# Patient Record
Sex: MALE | Race: WHITE | Employment: FULL TIME | URBAN - METROPOLITAN AREA
[De-identification: names, ages, dates, MRNs, and addresses within clinical notes are randomized per-mention and may not be internally consistent; named-entity substitution may affect disease eponyms.]

---

## 2017-09-18 ENCOUNTER — OFFICE VISIT (OUTPATIENT)
Dept: NEUROLOGY | Age: 57
End: 2017-09-18

## 2017-09-18 VITALS
HEART RATE: 63 BPM | OXYGEN SATURATION: 98 % | DIASTOLIC BLOOD PRESSURE: 84 MMHG | HEIGHT: 67 IN | WEIGHT: 199.6 LBS | BODY MASS INDEX: 31.33 KG/M2 | RESPIRATION RATE: 14 BRPM | TEMPERATURE: 97.1 F | SYSTOLIC BLOOD PRESSURE: 116 MMHG

## 2017-09-18 DIAGNOSIS — R20.2 PARESTHESIA: ICD-10-CM

## 2017-09-18 DIAGNOSIS — R42 DIZZINESS AND GIDDINESS: Primary | ICD-10-CM

## 2017-09-18 DIAGNOSIS — H81.13 BPPV (BENIGN PAROXYSMAL POSITIONAL VERTIGO), BILATERAL: ICD-10-CM

## 2017-09-18 DIAGNOSIS — M35.9 AUTOIMMUNE DISEASE (HCC): ICD-10-CM

## 2017-09-18 DIAGNOSIS — G45.0 VBI (VERTEBROBASILAR INSUFFICIENCY): ICD-10-CM

## 2017-09-18 NOTE — PROGRESS NOTES
Neurology Consult Note      HISTORY PROVIDED BY: patient    Chief Complaint:   Chief Complaint   Patient presents with    Dizziness    New Patient      Subjective:    Stew Carver is a 59 y.o. right handed male who presents in consultation for Dizziness. According to patient, symptoms started about 2 years ago, he got up in he morning and noticed that he was very unsteady and he had to hold on something to steady himself. He said that he waited thinking that it would go away, however, it did not, instead , he was experiencing nausea and the room appeared to be spinning, at this time he had to go the ER. In the ER, after the doctor evaluated him , was told that he had Vertigo, was given Meclizine, it later got better but since then, patient said he continue to have funny feeling in his head and intermittently, he will feel unsteady. He said that he also started having joint pains, and lot of fatigue, intermittent numbness and tingling sensation. He said that he has not noticed any thing that makes the condition worse or better, change in position does not affect the symptoms. Due to the symptom of unsteadiness , patient now is scared to engage in exercise program, he noted that he used to be very active and was going to the the gym. Because of the persistent symptoms , patient was referred to the clinic for evaluation and management  Admits occasional headache,nausea,  dizziness, neck pain, back pain, muscle and joint pain, numbness and tingling sensation. Denies loss of consciousness, tinnitus, double or blurry vision, dysphagia, odynophagia, chest pain, constipation, diarrhea, hematochezia, hematuria, dysuria    Past Medical History:   Diagnosis Date    Anxiety disorder     Fatigue     Joint pain     Vertigo       History reviewed. No pertinent surgical history.    Social History     Social History    Marital status: SINGLE     Spouse name: N/A    Number of children: N/A    Years of education: N/A Occupational History    Not on file. Social History Main Topics    Smoking status: Never Smoker    Smokeless tobacco: Never Used    Alcohol use 1.2 oz/week     2 Cans of beer per week    Drug use: No    Sexual activity: Yes     Partners: Female     Other Topics Concern    Not on file     Social History Narrative     History reviewed. No pertinent family history. Objective:   ROS  12 system review as per HPI  No Known Allergies     Meds:  Outpatient Medications Prior to Visit   Medication Sig Dispense Refill    meclizine (ANTIVERT) 25 mg tablet Take 1 Tab by mouth three (3) times daily as needed for Dizziness. 20 Tab 0    ondansetron (ZOFRAN ODT) 4 mg disintegrating tablet Take 1 Tab by mouth every eight (8) hours as needed for Nausea. 20 Tab 0     No facility-administered medications prior to visit. Imaging:  MRI Results (most recent):  No results found for this or any previous visit. CT Results (most recent):    Results from Hospital Encounter encounter on 06/21/15   CT HEAD WO CONT   Narrative **Final Report**      ICD Codes / Adm. Diagnosis: 089858   / Dizziness  dizziness  Examination:  CT HEAD WO CON  - [id number] - Jun 21 2015  7:04AM  Accession No:  [id number]  Reason:  Pain      REPORT:  EXAM:  CT HEAD WO CON    INDICATION:   Pain    COMPARISON: None. TECHNIQUE: Unenhanced CT of the head was performed using 5 mm images. Brain   and bone windows were generated. FINDINGS:  The ventricles and sulci are normal in size, shape and configuration and   midline. There is no significant white matter disease. There is no   intracranial hemorrhage, extra-axial collection, mass, mass effect or   midline shift. The basilar cisterns are open. No acute infarct is   identified. The bone windows demonstrate no abnormalities. The visualized   portions of the paranasal sinuses and mastoid air cells are clear.  There is   a tiny bubble of gas in the right cavernous sinus most likely related to IV catheter insertion. IMPRESSION: There is a tiny bubble of gas in the right cavernous sinus most   likely related to IV catheter insertion. No acute process is identified. Signing/Reading Doctor: Dottie Dacosta ([id number])    Approved: Dottie Dacosta ([id number])  Jun 21 2015  7:26AM                                     Reviewed records in [facility] [employer] tab today    Lab Review   Results for orders placed or performed during the hospital encounter of 06/21/15   CBC WITH AUTOMATED DIFF   Result Value Ref Range    WBC 6.2 4.1 - 11.1 K/uL    RBC 4.96 4.10 - 5.70 M/uL    HGB 15.2 12.1 - 17.0 g/dL    HCT 55.1 (H) 36.6 - 50.3 %    .1 (H) 80.0 - 99.0 FL    MCH 30.6 26.0 - 34.0 PG    MCHC 27.6 (L) 30.0 - 36.5 g/dL    RDW 22.9 (H) 11.5 - 14.5 %    PLATELET 420 (L) 409 - 400 K/uL    NEUTROPHILS 63 32 - 75 %    LYMPHOCYTES 27 12 - 49 %    MONOCYTES 6 5 - 13 %    EOSINOPHILS 3 0 - 7 %    BASOPHILS 1 0 - 1 %    ABS. NEUTROPHILS 3.8 1.8 - 8.0 K/UL    ABS. LYMPHOCYTES 1.7 0.8 - 3.5 K/UL    ABS. MONOCYTES 0.4 0.0 - 1.0 K/UL    ABS. EOSINOPHILS 0.2 0.0 - 0.4 K/UL    ABS. BASOPHILS 0.1 0.0 - 0.1 K/UL    RBC COMMENTS ANISOCYTOSIS  1+        RBC COMMENTS MACROCYTOSIS  2+        DF SMEAR SCANNED     METABOLIC PANEL, COMPREHENSIVE   Result Value Ref Range    Sodium 141 136 - 145 mmol/L    Potassium 4.1 3.5 - 5.1 mmol/L    Chloride 104 97 - 108 mmol/L    CO2 27 21 - 32 mmol/L    Anion gap 10 5 - 15 mmol/L    Glucose 201 (H) 65 - 100 mg/dL    BUN 20 6 - 20 MG/DL    Creatinine 1.06 0.45 - 1.15 MG/DL    BUN/Creatinine ratio 19 12 - 20      GFR est AA >60 >60 ml/min/1.73m2    GFR est non-AA >60 >60 ml/min/1.73m2    Calcium 8.3 (L) 8.5 - 10.1 MG/DL    Bilirubin, total 0.5 0.2 - 1.0 MG/DL    ALT (SGPT) 30 12 - 78 U/L    AST (SGOT) 17 15 - 37 U/L    Alk.  phosphatase 67 45 - 117 U/L    Protein, total 6.8 6.4 - 8.2 g/dL    Albumin 3.5 3.5 - 5.0 g/dL    Globulin 3.3 2.0 - 4.0 g/dL    A-G Ratio 1.1 1.1 - 2.2     URINALYSIS W/ REFLEX CULTURE   Result Value Ref Range    Color YELLOW/STRAW      Appearance CLEAR CLEAR      Specific gravity 1.028 1.003 - 1.030      pH (UA) 7.0 5.0 - 8.0      Protein NEGATIVE  NEG mg/dL    Glucose 100 (A) NEG mg/dL    Ketone NEGATIVE  NEG mg/dL    Bilirubin NEGATIVE  NEG      Blood NEGATIVE  NEG      Urobilinogen 1.0 0.2 - 1.0 EU/dL    Nitrites NEGATIVE  NEG      Leukocyte Esterase NEGATIVE  NEG      WBC 0-4 0 - 4 /hpf    RBC 0-5 0 - 5 /hpf    Epithelial cells FEW FEW /lpf    Bacteria NEGATIVE  NEG /hpf    UA:UC IF INDICATED CULTURE NOT INDICATED BY UA RESULT CNI      Mucus 1+ (A) NEG /lpf   TROPONIN I   Result Value Ref Range    Troponin-I, Qt. <0.04 <0.05 ng/mL   CK W/ REFLX CKMB   Result Value Ref Range    CK 74 39 - 308 U/L   EKG, 12 LEAD, INITIAL   Result Value Ref Range    Ventricular Rate 58 BPM    Atrial Rate 58 BPM    P-R Interval 174 ms    QRS Duration 98 ms    Q-T Interval 442 ms    QTC Calculation (Bezet) 433 ms    Calculated P Axis 56 degrees    Calculated R Axis 5 degrees    Calculated T Axis 48 degrees    Diagnosis       Sinus bradycardia  No previous ECGs available  Confirmed by Pelon Gomez ([id number]) on 6/21/2015 2:21:37 PM          Exam:  Visit Vitals    /84 (BP 1 Location: Left arm, BP Patient Position: Sitting)    Pulse 63    Temp 97.1 °F (36.2 °C) (Oral)    Resp 14    Ht 5' 7\" (1.702 m)    Wt 199 lb 9.6 oz (90.5 kg)    SpO2 98%    BMI 31.26 kg/m2     General:  Alert, cooperative, no distress. Head:  Normocephalic, without obvious abnormality, atraumatic. Respiratory:  Heart:   Non labored breathing  Regular rate and rhythm, no murmurs   Neck:   2+ carotids, no bruits   Extremities: Warm, no cyanosis or edema. Pulses: 2+ radial pulses. Neurologic:  MS: Alert and oriented x 4, speech intact. Language intact, able to name, repeat, and follow all commands. Attention and fund of knowledge appropriate. Recent and remote memory intact.   Cranial Nerves:  II: visual fields Full to confrontation   II: pupils Equal, round, reactive to light   II: optic disc No papilledema   III,VII: ptosis none   III,IV,VI: extraocular muscles  EOMI, no nystagmus or diplopia   V: facial light touch sensation  normal   VII: facial muscle function   symmetric   VIII: hearing intact   IX: soft palate elevation  normal   XI: trapezius strength  5/5   XI: sternocleidomastoid strength 5/5   XII: tongue  Midline     Motor: normal bulk and tone, no tremor              Strength: 5/5 throughout, no PD  Sensory: intact to LT, PP  Coordination: FTN and HTS intact, LANA intact,Romberg equivocal.  Gait: normal gait, unable to heel, toe, and tandem walk  Reflexes: 2+ symmetric, toes downgoing           Assessment/Plan       ICD-10-CM ICD-9-CM    1. Dizziness and giddiness R42 780.4 MRI BRAIN W WO CONT      VITAMIN B12      VITAMIN D, 25 HYDROXY      RHEUMATOID FACTOR, QL      TSH 3RD GENERATION      EEG      RPR      PROTEIN ELECTROPHORESIS   2. BPPV (benign paroxysmal positional vertigo), bilateral H81.13 386.11 MRI BRAIN W WO CONT      EEG      PROTEIN ELECTROPHORESIS   3. Paresthesia R20.2 782.0 MRI BRAIN W WO CONT      VITAMIN D, 25 HYDROXY      RHEUMATOID FACTOR, QL      TSH 3RD GENERATION      EEG      RPR      PROTEIN ELECTROPHORESIS   4. Autoimmune disease ([address] M35.9 279.49 VITAMIN B12      VITAMIN D, 25 HYDROXY      PROTEIN ELECTROPHORESIS   Plan:  MRI Brain w/wo contrast  EEG  Blood for autoimmune work up, B12,Vit D, TSH,RPR  May continue with Meclizine  ASA 325mg p.o. qd    Follow-up Disposition:  Return in about 4 weeks (around 10/16/2017).       Signed:  Jb Wiggins MD  9/18/2017

## 2017-09-18 NOTE — MR AVS SNAPSHOT
Visit Information Date & Time Provider Department Dept. Phone Encounter #  
 9/18/2017  2:00 PM Abdulaziz Mercado MD [facility] at [address] [place] [address] [id number] Follow-up Instructions Return in about 4 weeks (around 10/16/2017). Upcoming Health Maintenance Date Due Hepatitis C Screening 1960 DTaP/Tdap/Td series (1 - Tdap) 6/19/1979 FOBT Q 1 YEAR AGE 50-75 6/19/2008 INFLUENZA AGE 9 TO ADULT 8/1/2017 Allergies as of 9/18/2017  Review Complete On: 9/18/2017 By: Chidi Do MD  
 No Known Allergies Current Immunizations  Never Reviewed No immunizations on file. Not reviewed this visit You Were Diagnosed With   
  
 Codes Comments Dizziness and giddiness    -  Primary ICD-10-CM: S83 ICD-9-CM: 780.4 BPPV (benign paroxysmal positional vertigo), bilateral     ICD-10-CM: H81.13 
ICD-9-CM: 386.11 Paresthesia     ICD-10-CM: R20.2 ICD-9-CM: 782.0 [facility]immune disease ([facility]     ICD-10-CM: M35.9 ICD-9-CM: 279.49 Vitals BP Pulse Temp Resp Height(growth percentile) Weight(growth percentile) 116/84 (BP 1 Location: Left arm, BP Patient Position: Sitting) 63 97.1 °F (36.2 °C) (Oral) 14 5' 7\" (1.702 m) 199 lb 9.6 oz (90.5 kg) SpO2 BMI Smoking Status 98% 31.26 kg/m2 Never Smoker BMI and BSA Data Body Mass Index Body Surface Area  
 31.26 kg/m 2 2.07 m 2 Preferred Pharmacy Pharmacy Name Phone [address] [phone] Your Updated Medication List  
  
   
This list is accurate as of: 9/18/17  3:20 PM.  Always use your most recent med list.  
  
  
  
  
 CENTRUM PO Take  by mouth daily. meclizine 25 mg tablet Commonly known as:  ANTIVERT Take 1 Tab by mouth three (3) times daily as needed for Dizziness. ondansetron 4 mg disintegrating tablet Commonly known as:  ZOFRAN ODT  
 Take 1 Tab by mouth every eight (8) hours as needed for Nausea. VITAMIN D3 PO Take  by mouth daily. We Performed the Following PROTEIN ELECTROPHORESIS [35875 CPT(R)] RHEUMATOID FACTOR, QL [id number] CPT(R)] RPR [82854 CPT(R)] TSH 3RD GENERATION [86781 CPT(R)] VITAMIN B12 [id number] CPT(R)] VITAMIN D, 25 HYDROXY [id number] CPT(R)] Follow-up Instructions Return in about 4 weeks (around 10/16/2017). To-Do List   
 09/18/2017 Neurology:  EEG   
  
 09/18/2017 Imaging:  MRI BRAIN W WO CONT Introducing [facility] & HEALTH SERVICES! Torsten Larkin introduces [employer] patient portal. Now you can access parts of your medical record, email your doctor's office, and request medication refills online. 1. In your internet browser, go to [email or web address] 2. Click on the First Time User? Click Here link in the Sign In box. You will see the New Member Sign Up page. 3. Enter your [employer] Access Code exactly as it appears below. You will not need to use this code after youve completed the sign-up process. If you do not sign up before the expiration date, you must request a new code. · [employer] Access Code: [email or web address] Expires: 12/17/2017  2:05 PM 
 
4. Enter the last four digits of your Social Security Number (xxxx) and Date of Birth (mm/dd/yyyy) as indicated and click Submit. You will be taken to the next sign-up page. 5. Create a [employer] ID. This will be your Y[employer] login ID and cannot be changed, so think of one that is secure and easy to remember. 6. Create a [employer] password. You can change your password at any time. 7. Enter your Password Reset Question and Answer. This can be used at a later time if you forget your password. 8. Enter your e-mail address. You will receive e-mail notification when new information is available in [address] 9. Click Sign Up. You can now view and download portions of your medical record. 10. Click the Download Summary menu link to download a portable copy of your medical information. If you have questions, please visit the Frequently Asked Questions section of the [employer] website. Remember, [employer] is NOT to be used for urgent needs. For medical emergencies, dial 911. Now available from your iPhone and Android! Please provide this summary of care documentation to your next provider. Your primary care clinician is listed as Phys Other. If you have any questions after today's visit, please call [phone].

## 2017-09-21 LAB
25(OH)D3+25(OH)D2 SERPL-MCNC: 49.5 NG/ML (ref 30–100)
ALBUMIN SERPL ELPH-MCNC: 4 G/DL (ref 2.9–4.4)
ALBUMIN/GLOB SERPL: 1.4 {RATIO} (ref 0.7–1.7)
ALPHA1 GLOB SERPL ELPH-MCNC: 0.2 G/DL (ref 0–0.4)
ALPHA2 GLOB SERPL ELPH-MCNC: 0.6 G/DL (ref 0.4–1)
B-GLOBULIN SERPL ELPH-MCNC: 1.1 G/DL (ref 0.7–1.3)
GAMMA GLOB SERPL ELPH-MCNC: 0.9 G/DL (ref 0.4–1.8)
GLOBULIN SER CALC-MCNC: 2.8 G/DL (ref 2.2–3.9)
M PROTEIN SERPL ELPH-MCNC: NORMAL G/DL
PLEASE NOTE, 011150: NORMAL
PROT SERPL-MCNC: 6.8 G/DL (ref 6–8.5)
RHEUMATOID FACT SERPL-ACNC: <10 IU/ML (ref 0–13.9)
RPR SER QL: NON REACTIVE
TSH SERPL DL<=0.005 MIU/L-ACNC: 1.06 UIU/ML (ref 0.45–4.5)
VIT B12 SERPL-MCNC: 617 PG/ML (ref 211–946)

## 2017-09-27 ENCOUNTER — HOSPITAL ENCOUNTER (OUTPATIENT)
Dept: NEUROLOGY | Age: 57
Discharge: HOME | End: 2017-09-27
Attending: PSYCHIATRY & NEUROLOGY
Payer: OTHER GOVERNMENT

## 2017-09-27 ENCOUNTER — HOSPITAL ENCOUNTER (OUTPATIENT)
Dept: MRI IMAGING | Age: 57
Discharge: HOME | End: 2017-09-27
Attending: PSYCHIATRY & NEUROLOGY
Payer: OTHER GOVERNMENT

## 2017-09-27 VITALS — BODY MASS INDEX: 31.17 KG/M2 | WEIGHT: 199 LBS

## 2017-09-27 DIAGNOSIS — R42 DIZZINESS AND GIDDINESS: ICD-10-CM

## 2017-09-27 DIAGNOSIS — H81.13 BPPV (BENIGN PAROXYSMAL POSITIONAL VERTIGO), BILATERAL: ICD-10-CM

## 2017-09-27 DIAGNOSIS — R20.2 PARESTHESIA: ICD-10-CM

## 2017-09-27 PROCEDURE — 70553 MRI BRAIN STEM W/O & W/DYE: CPT

## 2017-09-27 PROCEDURE — A9576 INJ PROHANCE MULTIPACK: HCPCS | Performed by: RADIOLOGY

## 2017-09-27 PROCEDURE — 95816 EEG AWAKE AND DROWSY: CPT

## 2017-09-27 PROCEDURE — 74011250636 HC RX REV CODE- 250/636: Performed by: RADIOLOGY

## 2017-09-27 RX ADMIN — GADOTERIDOL 18 ML: 279.3 INJECTION, SOLUTION INTRAVENOUS at 09:44

## 2017-10-25 ENCOUNTER — OFFICE VISIT (OUTPATIENT)
Dept: NEUROLOGY | Age: 57
End: 2017-10-25

## 2017-10-25 VITALS
DIASTOLIC BLOOD PRESSURE: 100 MMHG | HEIGHT: 67 IN | OXYGEN SATURATION: 97 % | HEART RATE: 71 BPM | SYSTOLIC BLOOD PRESSURE: 140 MMHG | RESPIRATION RATE: 16 BRPM | BODY MASS INDEX: 31.55 KG/M2 | WEIGHT: 201 LBS | TEMPERATURE: 97.1 F

## 2017-10-25 DIAGNOSIS — H81.13 BENIGN PAROXYSMAL POSITIONAL VERTIGO DUE TO BILATERAL VESTIBULAR DISORDER: Primary | ICD-10-CM

## 2017-10-25 DIAGNOSIS — R20.2 PARESTHESIA: ICD-10-CM

## 2017-10-25 NOTE — MR AVS SNAPSHOT
Visit Information Date & Time Provider Department Dept. Phone Encounter #  
 10/25/2017  2:00 PM Abdulaziz Nicole MD [facility] at [address] [place] [address] [id number] Follow-up Instructions Return if symptoms worsen or fail to improve. Upcoming Health Maintenance Date Due Hepatitis C Screening 1960 DTaP/Tdap/Td series (1 - Tdap) 6/19/1979 FOBT Q 1 YEAR AGE 50-75 6/19/2008 INFLUENZA AGE 9 TO ADULT 8/1/2017 Allergies as of 10/25/2017  Review Complete On: 10/25/2017 By: Stacy Duran MD  
 No Known Allergies Current Immunizations  Never Reviewed No immunizations on file. Not reviewed this visit You Were Diagnosed With   
  
 Codes Comments Benign paroxysmal positional vertigo due to bilateral vestibular disorder    -  Primary ICD-10-CM: H81.13 
ICD-9-CM: 386.11 Paresthesia     ICD-10-CM: R20.2 ICD-9-CM: 536. 0 Vitals BP Pulse Temp Resp Height(growth percentile) Weight(growth percentile) (!) 140/100 (BP 1 Location: Right arm, BP Patient Position: Sitting) 71 97.1 °F (36.2 °C) (Oral) 16 5' 7\" (1.702 m) 201 lb (91.2 kg) SpO2 BMI Smoking Status 97% 31.48 kg/m2 Never Smoker Vitals History BMI and BSA Data Body Mass Index Body Surface Area  
 31.48 kg/m 2 2.08 m 2 Preferred Pharmacy Pharmacy Name Phone [address] [facility] [phone] Your Updated Medication List  
  
   
This list is accurate as of: 10/25/17  2:11 PM.  Always use your most recent med list.  
  
  
  
  
 CENTRUM PO Take  by mouth daily. meclizine 25 mg tablet Commonly known as:  ANTIVERT Take 1 Tab by mouth three (3) times daily as needed for Dizziness. ondansetron 4 mg disintegrating tablet Commonly known as:  ZOFRAN ODT Take 1 Tab by mouth every eight (8) hours as needed for Nausea. VITAMIN D3 PO Take  by mouth daily. Follow-up Instructions Return if symptoms worsen or fail to improve. Introducing [facility] & HEALTH SERVICES! [facility] introduces [employer] patient portal. Now you can access parts of your medical record, email your doctor's office, and request medication refills online. 1. In your internet browser, go to [email or web address] 2. Click on the First Time User? Click Here link in the Sign In box. You will see the New Member Sign Up page. 3. Enter your [employer] Access Code exactly as it appears below. You will not need to use this code after youve completed the sign-up process. If you do not sign up before the expiration date, you must request a new code. · [employer] Access Code: [email or web address] Expires: 12/17/2017  2:05 PM 
 
4. Enter the last four digits of your Social Security Number (xxxx) and Date of Birth (mm/dd/yyyy) as indicated and click Submit. You will be taken to the next sign-up page. 5. Create a [employer] ID. This will be your [employer] login ID and cannot be changed, so think of one that is secure and easy to remember. 6. Create a [employer] password. You can change your password at any time. 7. Enter your Password Reset Question and Answer. This can be used at a later time if you forget your password. 8. Enter your e-mail address. You will receive e-mail notification when new information is available in [address] 9. Click Sign Up. You can now view and download portions of your medical record. 10. Click the Download Summary menu link to download a portable copy of your medical information. If you have questions, please visit the Frequently Asked Questions section of the [employer] website. Remember, [employer] is NOT to be used for urgent needs. For medical emergencies, dial 911. Now available from your iPhone and Android! Please provide this summary of care documentation to your next provider. Your primary care clinician is listed as Phys Other. If you have any questions after today's visit, please call [phone].

## 2017-10-25 NOTE — PROGRESS NOTES
Chief Complaint   Patient presents with    Dizziness    Results     EEG and MRI     1. Have you been to the ER, urgent care clinic since your last visit? Hospitalized since your last visit? no    2. Have you seen or consulted any other health care providers outside of the [address] [place] [address] since your last visit? Include any pap smears or colon screening.  No

## 2017-10-25 NOTE — PROGRESS NOTES
Neurology Progress Note    NAME:  Aranza Cordero   :   1960   MRN:   [id number]     Date/Time:  10/25/2017  Subjective:      Aranza Cordero is a 59 y.o. male here today for follow up for vertigo and results of investigations. Says the the vertigo has improved, did not have any since last visit, he however, experiences occasional numbness. MRI Brain and blood work were reviewed with patient and they all unremarkable. Review of Systems - General ROS: negative  Psychological ROS: negative  Ophthalmic ROS: negative for - blurry vision, decreased vision, double vision or photophobia  ENT ROS: negative for - headaches, tinnitus or visual changes  Allergy and Immunology ROS: negative  negative for - Allergy  Hematological and Lymphatic ROS: negative  Endocrine ROS: negative  Respiratory ROS: no cough, shortness of breath, or wheezing  Cardiovascular ROS: no chest pain or dyspnea on exertion  Gastrointestinal ROS: no abdominal pain, change in bowel habits, or black or bloody stools  Genito-Urinary ROS: no dysuria, trouble voiding, or hematuria  Musculoskeletal ROS: negative  Neurological ROS: positive for - numbness/tingling  Dermatological ROS: negative      Medications reviewed:  Current Outpatient Prescriptions   Medication Sig Dispense Refill    FOLIC ACID/MV,IRON,MIN (CENTRUM PO) Take  by mouth daily.  CHOLECALCIFEROL, VITAMIN D3, (VITAMIN D3 PO) Take  by mouth daily.  meclizine (ANTIVERT) 25 mg tablet Take 1 Tab by mouth three (3) times daily as needed for Dizziness. 20 Tab 0    ondansetron (ZOFRAN ODT) 4 mg disintegrating tablet Take 1 Tab by mouth every eight (8) hours as needed for Nausea.  20 Tab 0        Objective:   Vitals:  Vitals:    10/25/17 1331 10/25/17 1333   BP: (!) 140/101 (!) 140/100   Pulse: 71 71   Resp: 16    Temp: 97.1 °F (36.2 °C)    TempSrc: Oral    SpO2: 97%    Weight: 201 lb (91.2 kg)    Height: 5' 7\" (1.702 m)    PainSc:   0 - No pain      Lab Data Reviewed:  Lab Results Component Value Date/Time    WBC 6.2 06/21/2015 06:21 AM    HCT 55.1 06/21/2015 06:21 AM    HGB 15.2 06/21/2015 06:21 AM    PLATELET 211 33/83/8708 06:21 AM       Lab Results   Component Value Date/Time    Sodium 141 06/21/2015 06:21 AM    Potassium 4.1 06/21/2015 06:21 AM    Chloride 104 06/21/2015 06:21 AM    CO2 27 06/21/2015 06:21 AM    Glucose 201 06/21/2015 06:21 AM    BUN 20 06/21/2015 06:21 AM    Creatinine 1.06 06/21/2015 06:21 AM    Calcium 8.3 06/21/2015 06:21 AM       No components found for: TROPQUANT    No results found for: DANY      No results found for: HBA1C, HGBE8, YCE5SGQH, GXN0NOEH     Lab Results   Component Value Date/Time    Vitamin B12 617 09/19/2017 03:48 PM       No results found for: DANY, ANARX, ANAIGG, XBANA    No results found for: CHOL, CHOLPOCT, CHOLX, CHLST, CHOLV, HDL, HDLPOC, LDL, LDLCPOC, LDLC, DLDLP, VLDLC, VLDL, TGLX, TRIGL, TRIGP, TGLPOCT, CHHD, CHHDX      CT Results (recent):    Results from [place] [facility] encounter on 06/21/15   CT HEAD WO CONT   Narrative **Final Report**      ICD Codes / Adm. Diagnosis: 740038   / Dizziness  dizziness  Examination:  CT HEAD WO CON  - [id number] - Jun 21 2015  7:04AM  Accession No:  [id number]  Reason:  Pain      REPORT:  EXAM:  CT HEAD WO CON    INDICATION:   Pain    COMPARISON: None. TECHNIQUE: Unenhanced CT of the head was performed using 5 mm images. Brain   and bone windows were generated. FINDINGS:  The ventricles and sulci are normal in size, shape and configuration and   midline. There is no significant white matter disease. There is no   intracranial hemorrhage, extra-axial collection, mass, mass effect or   midline shift. The basilar cisterns are open. No acute infarct is   identified. The bone windows demonstrate no abnormalities. The visualized   portions of the paranasal sinuses and mastoid air cells are clear. There is   a tiny bubble of gas in the right cavernous sinus most likely related to IV   catheter insertion. IMPRESSION: There is a tiny bubble of gas in the right cavernous sinus most   likely related to IV catheter insertion. No acute process is identified. Signing/Reading Doctor: Dread Soliz ([id number])    Approved: Dread Soliz ([id number])  Jun 21 2015  7:26AM                                    MRI Results (recent):    Results from [facility] encounter on 09/27/17   MRI BRAIN W WO CONT   Narrative INDICATION:   vertigo, dizziness     EXAMINATION:  MR BRAIN WITH/WITHOUT CONTRAST, IAC PROTOCOL    COMPARISON:  CT June 21, 2015    TECHNIQUE:  MR imaging of the brain was performed with sagittal T1, axial T1,  T2, FLAIR, DWI/ADC, axial FIESTA, thin slice pre and post contrast T1 through  the internal auditory canals using 20 mL gadolinium. FINDINGS:      Ventricles:  Midline, no hydrocephalus. Intracranial Hemorrhage:  None. Brain Parenchyma/Brainstem: There are a few small foci of T2 hyperintense  signal in the bilateral frontal lobe white matter. No acute infarction. Basal Cisterns:  Normal.   Flow Voids:  Normal.  Internal Auditory Canals/Cranial Nerves:  Evaluation of the cranial nerves is  unremarkable. There is normal T2 hyperintensity without abnormal enhancement in  the inner ear structures bilaterally  Post Contrast:  No abnormal parenchymal or meningeal enhancement. Additional Comments:  N/A. Impression IMPRESSION:  Minimal chronic white matter signal abnormalities as above. Normal evaluation of  the internal auditory canals and cerebellopontine angles. No acute process. IR Results (recent):  No results found for this or any previous visit. VAS/US Results (recent):  No results found for this or any previous visit. PHYSICAL EXAM:  General:    Alert, cooperative, no distress, appears stated age. Head:   Normocephalic, without obvious abnormality, atraumatic. Eyes:   Conjunctivae/corneas clear.   PERRLA  Nose:  Nares normal. No drainage or sinus tenderness. Throat:    Lips, mucosa, and tongue normal.  No Thrush  Neck:  Supple, symmetrical,  no adenopathy, thyroid: non tender    no carotid bruit and no JVD. Back:    Symmetric,  No CVA tenderness. Lungs:   Clear to auscultation bilaterally. No Wheezing or Rhonchi. No rales. Chest wall:  No tenderness or deformity. No Accessory muscle use. Heart:   Regular rate and rhythm,  no murmur, rub or gallop. Abdomen:   Soft, non-tender. Not distended. Bowel sounds normal. No masses  Extremities: Extremities normal, atraumatic, No cyanosis. No edema. No clubbing  Skin:     Texture, turgor normal. No rashes or lesions. Not Jaundiced  Lymph nodes: Cervical, supraclavicular normal.  Psych:  Good insight. Not depressed. Not anxious or agitated. NEUROLOGICAL EXAM:  Appearance: The patient is well developed, well nourished, provides a coherent history and is in no acute distress. Mental Status: Oriented to time, place and person. Mood and affect appropriate. Cranial Nerves:   Intact visual fields. Fundi are benign. SELINA, EOM's full, no nystagmus, no ptosis. Facial sensation is normal. Corneal reflexes are intact. Facial movement is symmetric. Hearing is normal bilaterally. Palate is midline with normal sternocleidomastoid and trapezius muscles are normal. Tongue is midline. Motor:  5/5 strength in upper and lower proximal and distal muscles. Normal bulk and tone. No fasciculations. Reflexes:   Deep tendon reflexes 2+/4 and symmetrical.   Sensory:   Normal to touch, pinprick and vibration. Gait:  Normal gait. Tremor:   No tremor noted. Cerebellar:  No cerebellar signs present. Neurovascular:  Normal heart sounds and regular rhythm, peripheral pulses intact, and no carotid bruits. Assesment  1. Benign paroxysmal positional vertigo due to bilateral vestibular disorder  Improved    2.  Paresthesia  Stable    ___________________________________________________  PLAN:Medication discussed with patient. Follow up as needed. ICD-10-CM ICD-9-CM    1. Benign paroxysmal positional vertigo due to bilateral vestibular disorder H81.13 386.11    2.  Paresthesia R20.2 782.0      Follow-up Disposition: Not on File          ___________________________________________________    Total time spent with patient:  []15   []25   []35   [] __ minutes    Care Plan discussed with:    [x]Patient   []Family    []Care Manager   []Consultant/Specialist :    ___________________________________________________    Attending Physician: Becky De La Cruz MD